# Patient Record
Sex: MALE | Race: BLACK OR AFRICAN AMERICAN | NOT HISPANIC OR LATINO | Employment: FULL TIME | URBAN - METROPOLITAN AREA
[De-identification: names, ages, dates, MRNs, and addresses within clinical notes are randomized per-mention and may not be internally consistent; named-entity substitution may affect disease eponyms.]

---

## 2024-02-21 ENCOUNTER — APPOINTMENT (OUTPATIENT)
Dept: RADIOLOGY | Facility: HOSPITAL | Age: 54
End: 2024-02-21
Payer: MEDICAID

## 2024-02-21 ENCOUNTER — HOSPITAL ENCOUNTER (EMERGENCY)
Facility: HOSPITAL | Age: 54
Discharge: HOME | End: 2024-02-21
Attending: EMERGENCY MEDICINE
Payer: MEDICAID

## 2024-02-21 VITALS
HEART RATE: 75 BPM | WEIGHT: 139 LBS | SYSTOLIC BLOOD PRESSURE: 144 MMHG | HEIGHT: 66 IN | OXYGEN SATURATION: 98 % | BODY MASS INDEX: 22.34 KG/M2 | RESPIRATION RATE: 18 BRPM | DIASTOLIC BLOOD PRESSURE: 103 MMHG | TEMPERATURE: 98 F

## 2024-02-21 DIAGNOSIS — H57.12 PAIN OF LEFT EYE: ICD-10-CM

## 2024-02-21 DIAGNOSIS — R51.9 NONINTRACTABLE HEADACHE, UNSPECIFIED CHRONICITY PATTERN, UNSPECIFIED HEADACHE TYPE: Primary | ICD-10-CM

## 2024-02-21 PROCEDURE — 70450 CT HEAD/BRAIN W/O DYE: CPT | Performed by: RADIOLOGY

## 2024-02-21 PROCEDURE — 96372 THER/PROPH/DIAG INJ SC/IM: CPT

## 2024-02-21 PROCEDURE — 2500000004 HC RX 250 GENERAL PHARMACY W/ HCPCS (ALT 636 FOR OP/ED)

## 2024-02-21 PROCEDURE — 70450 CT HEAD/BRAIN W/O DYE: CPT

## 2024-02-21 PROCEDURE — 99284 EMERGENCY DEPT VISIT MOD MDM: CPT | Mod: 25

## 2024-02-21 PROCEDURE — 2500000001 HC RX 250 WO HCPCS SELF ADMINISTERED DRUGS (ALT 637 FOR MEDICARE OP)

## 2024-02-21 RX ORDER — ERYTHROMYCIN 5 MG/G
OINTMENT OPHTHALMIC 3 TIMES DAILY
Qty: 3.5 G | Refills: 0 | Status: SHIPPED | OUTPATIENT
Start: 2024-02-21 | End: 2024-02-26

## 2024-02-21 RX ORDER — KETOROLAC TROMETHAMINE 30 MG/ML
15 INJECTION, SOLUTION INTRAMUSCULAR; INTRAVENOUS ONCE
Status: COMPLETED | OUTPATIENT
Start: 2024-02-21 | End: 2024-02-21

## 2024-02-21 RX ORDER — METOCLOPRAMIDE 10 MG/1
10 TABLET ORAL ONCE
Status: COMPLETED | OUTPATIENT
Start: 2024-02-21 | End: 2024-02-21

## 2024-02-21 RX ORDER — ACETAMINOPHEN 325 MG/1
650 TABLET ORAL ONCE
Status: COMPLETED | OUTPATIENT
Start: 2024-02-21 | End: 2024-02-21

## 2024-02-21 RX ADMIN — ACETAMINOPHEN 650 MG: 325 TABLET ORAL at 17:09

## 2024-02-21 RX ADMIN — KETOROLAC TROMETHAMINE 15 MG: 30 INJECTION, SOLUTION INTRAMUSCULAR; INTRAVENOUS at 17:09

## 2024-02-21 RX ADMIN — FLUORESCEIN SODIUM 1 STRIP: 1 STRIP OPHTHALMIC at 16:30

## 2024-02-21 RX ADMIN — METOCLOPRAMIDE 10 MG: 10 TABLET ORAL at 17:09

## 2024-02-21 ASSESSMENT — VISUAL ACUITY
OD: 20/20
OS: 20/25

## 2024-02-21 ASSESSMENT — PAIN - FUNCTIONAL ASSESSMENT: PAIN_FUNCTIONAL_ASSESSMENT: 0-10

## 2024-02-21 ASSESSMENT — PAIN SCALES - GENERAL: PAINLEVEL_OUTOF10: 7

## 2024-02-21 ASSESSMENT — COLUMBIA-SUICIDE SEVERITY RATING SCALE - C-SSRS
1. IN THE PAST MONTH, HAVE YOU WISHED YOU WERE DEAD OR WISHED YOU COULD GO TO SLEEP AND NOT WAKE UP?: NO
6. HAVE YOU EVER DONE ANYTHING, STARTED TO DO ANYTHING, OR PREPARED TO DO ANYTHING TO END YOUR LIFE?: NO
2. HAVE YOU ACTUALLY HAD ANY THOUGHTS OF KILLING YOURSELF?: NO

## 2024-02-21 ASSESSMENT — PAIN DESCRIPTION - LOCATION: LOCATION: NECK

## 2024-02-21 NOTE — Clinical Note
Wisam Davis was seen and treated in our emergency department on 2/21/2024.  He may return to work on 02/22/2024.       If you have any questions or concerns, please don't hesitate to call.      Ne Lu PA-C

## 2024-02-21 NOTE — ED TRIAGE NOTES
Patient ambulatory to ED with concern of foreign body to L eye x1 week. Patient states eye pain has been causing headaches radiating into his neck.

## 2024-02-21 NOTE — ED PROVIDER NOTES
HPI   Chief Complaint   Patient presents with    Headache    Eye Problem       HPI  HISTORY OF PRESENT ILLNESS:  53 y.o. male presenting to the ED with complaint of left eye pain for the past 1 week and headache for the past 3 days.  Patient states that he thinks that he got a foreign body in the left eye, but there was no specific instance of any object or foreign body entering his eye.  He states that he woke up 1 week ago with pain and irritation and foreign body sensation in the left eye.  He states it has been tearing excessively, mildly red, irritated and painful.  He states it feels like the foreign body moves around inside his eyelids.  He denies blurry vision, no vision loss or changes.  Denies swelling, redness, or pain of the eyelids or periorbital area.  He states that about 3 days ago he began having right-sided headaches, seem to start in the right occipital area and adjacent right neck and radiate around the right side of the head behind the right ear.  He describes it as aching in quality.  Not severe, he has not taken any medications for headache.  Headache was not thunderclap in onset and is not the worst headache of his life.  He denies nausea or vomiting.  No difficulty with gait, speech, or swallowing.  No fevers or chills.  No neck stiffness.  No extremity numbness, tingling, or weakness.  No dizziness, lightheadedness, or syncope.  No chest pain or shortness of breath.  No abdominal pain.  Does not wear contacts and does not use glasses.  No other complaints or symptoms voiced.    12 point review of systems was performed and is negative unless otherwise specified in HPI.    PMH: denies  Family history: noncontributory  Social history: +smoker, no ETOH, no illicit substances  No past medical history on file.      Labs Reviewed - No data to display  No orders to display              [place] Coma Scale Score: 15                     Patient History   No past medical history on file.  No past  surgical history on file.  No family history on file.  Social History     Tobacco Use    Smoking status: Not on file    Smokeless tobacco: Not on file   Substance Use Topics    Alcohol use: Not on file    Drug use: Not on file       Physical Exam   ED Triage Vitals [02/21/24 1525]   Temperature Heart Rate Respirations BP   36.7 °C (98 °F) 75 18 (!) 144/103      Pulse Ox Temp src Heart Rate Source Patient Position   98 % -- -- --      BP Location FiO2 (%)     -- --       Physical Exam  Constitutional:       General: He is not in acute distress.     Appearance: He is not ill-appearing, toxic-appearing or diaphoretic.   HENT:      Head: Normocephalic and atraumatic.      Nose: No congestion.      Mouth/Throat:      Mouth: Mucous membranes are moist.   Eyes:      General: No visual field deficit or scleral icterus.     Extraocular Movements: Extraocular movements intact.      Right eye: Normal extraocular motion and no nystagmus.      Left eye: Normal extraocular motion and no nystagmus.      Pupils: Pupils are equal, round, and reactive to light. Pupils are equal.      Right eye: Pupil is round and reactive.      Left eye: Pupil is round and reactive.      Comments: Right eye WNL.    Left eye: +mild conjunctival injection, mild excessive tearing noted. Eyelids everted, and there is a very tiny punctate calcification on the mid medial upper eyelid, not consistent with a stye or chalazion, nontender.  No other foreign bodies noted.  No limbal injection, no purulent or crusting discharge.  No edema or erythema of the eyelids or periorbital area.  Extraocular movements intact and nonpainful. No evidence of preseptal or orbital cellulitis.  Pupils equal round and reactive. Globes equally firm bilaterally. No visual field deficits. No nystagmus. No direct or consensual photophobia. No proptosis.    IOP 15 mmHg on left (affected) eye, 16 mmHg on right (unaffected) eye    Visual acuity: Bilateral 20/15  Right 20/20  Left  20/25    Fluorescein stain performed.  Irritation and pain completely resolved after instillation of tetracaine drops.  After fluorescein instilled under Woods lamp, there is a very very tiny area of uptake likely representing an abrasion between 5 and 6 o'clock.  No Dendritic lesions, no corneal or conjunctival foreign bodies noted, negative Layo sign.    Neck:      Comments: Mild TTP over the right superior cervical paraspinal musculature.  No midline tenderness over the cervical or thoracic spine.  No step-offs or crepitus.  Upper extremities neurovascular intact, no motor or sensory deficits, 2+ radial pulses.  Sensation intact and equal throughout the face, neck, and BUE.  Cardiovascular:      Rate and Rhythm: Normal rate and regular rhythm.      Pulses: Normal pulses.   Pulmonary:      Effort: Pulmonary effort is normal. No respiratory distress.      Breath sounds: Normal breath sounds.   Musculoskeletal:         General: Normal range of motion.      Cervical back: Normal range of motion and neck supple. No rigidity.      Right lower leg: No edema.      Left lower leg: No edema.   Lymphadenopathy:      Cervical: No cervical adenopathy.   Skin:     General: Skin is warm and dry.      Capillary Refill: Capillary refill takes less than 2 seconds.   Neurological:      General: No focal deficit present.      Mental Status: He is alert and oriented to person, place, and time.      GCS: GCS eye subscore is 4. GCS verbal subscore is 5. GCS motor subscore is 6.      Cranial Nerves: No cranial nerve deficit, dysarthria or facial asymmetry.      Sensory: No sensory deficit.      Motor: No weakness.      Coordination: Coordination normal.      Gait: Gait normal.   Psychiatric:         Mood and Affect: Mood normal.         Behavior: Behavior normal.         Judgment: Judgment normal.     ED Course & MDM   Diagnoses as of 02/21/24 8808   Nonintractable headache, unspecified chronicity pattern, unspecified headache type    Pain of left eye       Medical Decision Making  ED course / MDM     Summary:  Patient presented with left eye pain and headache.  Vital signs stable, patient appears nontoxic.  Ambulates unassisted with steady gait.  On exam, there is mild conjunctival injection of the left eye, mild excessive tearing, eyelids everted and there is a very small calcification on the inner upper eyelid, not consistent with a hordeolum or stye, is not foreign body.  At that time he felt like the foreign body was at the lower lid.  No limbal injection, no edema or erythema of the eyelids or periorbital area, no purulent or crusting discharge.  Extraocular movements intact and nonpainful.  No visual field deficits.  Visual acuity intact, normal IOP.  Pain significantly improved after instillation of tetracaine drops.  Fluorescein stain performed, and there is a very tiny area of uptake likely representing an abrasion at the 5 to 6 o'clock position.  Negative Lyao sign.  No foreign bodies noted.  There is some tenderness over the right superior cervical paraspinal musculature, no midline spinal tenderness.  No ataxia, no motor or sensory deficits of the upper or lower extremities, no gross neurologic deficits.  Will obtain CT head as well.  Patient was given a dose of Reglan, Toradol, and Tylenol in the ED, which significantly improved his headache. CT head shows no acute process, no acute cortical infarct, intracranial hemorrhage, or skull fracture. Patient case discussed with ED attending Dr. Jenkins, who also saw and evaluated the patient. Results and differential were discussed in detail with the patient.  Has a very small corneal abrasion which may be the cause of his eye pain.  His discomfort is not severe, he has normal visual acuity and IOP, no evidence of acute glaucoma or iritis.  There is no visualized foreign body on exam.  Will prescribe erythromycin ointment and have the patient follow-up with ophthalmology.  Headache  is low risk and description, not the risk of his life, not thunderclap in onset, no meningeal signs, no systemic symptoms, doubt SAH or meningitis, patient agrees LP is not indicated.  Most consistent with a tension headache or occipital neuralgia.  He will follow-up with his PCP regarding all of his complaints today.  Patient is eager to be discharged. Patient was given strict return precautions, understands reasons to return to the ED. Also discussed supportive care instructions. I expressed the importance of outpatient follow up with their PCP. All questions were answered, patient expressed understanding and stated that they would comply.    Patient was advised to follow up with PCP or recommended provider in 2-3 days for another evaluation and exam. I advised patient and family/friend/caregiver/guardian to return or go to closest emergency room immediately if symptoms change, get worse, new symptoms develop prior to follow up. If there is no improvement in symptoms in the next 24 hours they are advised to return for further evaluation and exam. I also explained the plan and treatment course. Patient and family/friend/caregiver/guardian is in agreement with plan, treatment course, and follow up and states verbally that they will comply.    Impression:  1. See diagnosis    Plan: Homegoing. I discussed the differential, results, and discharge plan with the patient and family/friend/caregiver. I emphasized the importance of follow-up with the physician I referred them to in the timeframe recommended.  I explained reasons for the patient to return to the Emergency Department. They agreed that if they feel their condition is worsening or if they have any other concern they should call 911 immediately for further assistance. We also discussed medications that were prescribed including common side effects and interactions. The patient was advised to abstain from driving, operating heavy machinery, or making significant  decisions while taking medications such as opiates and muscle relaxers that may impair this. I gave the patient an opportunity to ask all questions they had and answered all of them accordingly. They understand return precautions and discharge instructions. The patient and family/friend/caregiver expressed understanding verbally and that they would comply.       Disposition: Discharge    Patient seen and discussed with Dr. Jenkins    This note has been transcribed using voice recognition and may contain grammatical errors, misplaced words, incorrect words, incorrect phrases or other errors.   Procedure  Procedures     Ne Lu PA-C  02/21/24 0902

## 2024-06-12 ENCOUNTER — APPOINTMENT (OUTPATIENT)
Dept: RADIOLOGY | Facility: HOSPITAL | Age: 54
End: 2024-06-12
Payer: MEDICAID

## 2024-06-12 ENCOUNTER — HOSPITAL ENCOUNTER (EMERGENCY)
Facility: HOSPITAL | Age: 54
Discharge: HOME | End: 2024-06-12
Payer: MEDICAID

## 2024-06-12 VITALS
RESPIRATION RATE: 16 BRPM | BODY MASS INDEX: 21.69 KG/M2 | WEIGHT: 135 LBS | SYSTOLIC BLOOD PRESSURE: 117 MMHG | HEIGHT: 66 IN | TEMPERATURE: 98.1 F | OXYGEN SATURATION: 99 % | HEART RATE: 82 BPM | DIASTOLIC BLOOD PRESSURE: 81 MMHG

## 2024-06-12 DIAGNOSIS — Z11.3 SCREEN FOR STD (SEXUALLY TRANSMITTED DISEASE): Primary | ICD-10-CM

## 2024-06-12 DIAGNOSIS — M54.50 ACUTE LOW BACK PAIN, UNSPECIFIED BACK PAIN LATERALITY, UNSPECIFIED WHETHER SCIATICA PRESENT: ICD-10-CM

## 2024-06-12 DIAGNOSIS — R82.998 URINE WHITE BLOOD CELLS INCREASED: ICD-10-CM

## 2024-06-12 LAB
APPEARANCE UR: CLEAR
BACTERIA #/AREA URNS AUTO: ABNORMAL /HPF
BILIRUB UR STRIP.AUTO-MCNC: NEGATIVE MG/DL
COLOR UR: YELLOW
GLUCOSE UR STRIP.AUTO-MCNC: NORMAL MG/DL
KETONES UR STRIP.AUTO-MCNC: ABNORMAL MG/DL
LEUKOCYTE ESTERASE UR QL STRIP.AUTO: ABNORMAL
MUCOUS THREADS #/AREA URNS AUTO: ABNORMAL /LPF
NITRITE UR QL STRIP.AUTO: NEGATIVE
PH UR STRIP.AUTO: 5 [PH]
PROT UR STRIP.AUTO-MCNC: ABNORMAL MG/DL
RBC # UR STRIP.AUTO: ABNORMAL /UL
RBC #/AREA URNS AUTO: ABNORMAL /HPF
SP GR UR STRIP.AUTO: 1.03
SQUAMOUS #/AREA URNS AUTO: ABNORMAL /HPF
UROBILINOGEN UR STRIP.AUTO-MCNC: ABNORMAL MG/DL
WBC #/AREA URNS AUTO: >50 /HPF

## 2024-06-12 PROCEDURE — 2500000005 HC RX 250 GENERAL PHARMACY W/O HCPCS

## 2024-06-12 PROCEDURE — 87661 TRICHOMONAS VAGINALIS AMPLIF: CPT | Mod: AHULAB

## 2024-06-12 PROCEDURE — 2500000004 HC RX 250 GENERAL PHARMACY W/ HCPCS (ALT 636 FOR OP/ED)

## 2024-06-12 PROCEDURE — 86780 TREPONEMA PALLIDUM: CPT | Mod: AHULAB

## 2024-06-12 PROCEDURE — 99283 EMERGENCY DEPT VISIT LOW MDM: CPT

## 2024-06-12 PROCEDURE — 72110 X-RAY EXAM L-2 SPINE 4/>VWS: CPT

## 2024-06-12 PROCEDURE — 72110 X-RAY EXAM L-2 SPINE 4/>VWS: CPT | Performed by: STUDENT IN AN ORGANIZED HEALTH CARE EDUCATION/TRAINING PROGRAM

## 2024-06-12 PROCEDURE — 87389 HIV-1 AG W/HIV-1&-2 AB AG IA: CPT | Mod: AHULAB

## 2024-06-12 PROCEDURE — 96372 THER/PROPH/DIAG INJ SC/IM: CPT

## 2024-06-12 PROCEDURE — 36415 COLL VENOUS BLD VENIPUNCTURE: CPT

## 2024-06-12 PROCEDURE — 2500000001 HC RX 250 WO HCPCS SELF ADMINISTERED DRUGS (ALT 637 FOR MEDICARE OP)

## 2024-06-12 PROCEDURE — 87491 CHLMYD TRACH DNA AMP PROBE: CPT | Mod: AHULAB

## 2024-06-12 PROCEDURE — 81001 URINALYSIS AUTO W/SCOPE: CPT

## 2024-06-12 RX ORDER — DOXYCYCLINE HYCLATE 100 MG
100 TABLET ORAL ONCE
Status: COMPLETED | OUTPATIENT
Start: 2024-06-12 | End: 2024-06-12

## 2024-06-12 RX ORDER — LIDOCAINE 50 MG/G
1 PATCH TOPICAL DAILY
Qty: 5 PATCH | Refills: 0 | Status: SHIPPED | OUTPATIENT
Start: 2024-06-12 | End: 2024-06-12

## 2024-06-12 RX ORDER — LIDOCAINE 50 MG/G
1 PATCH TOPICAL DAILY
Qty: 5 PATCH | Refills: 0 | Status: SHIPPED | OUTPATIENT
Start: 2024-06-12 | End: 2024-06-17

## 2024-06-12 RX ORDER — NAPROXEN 500 MG/1
500 TABLET ORAL EVERY 12 HOURS
Qty: 6 TABLET | Refills: 0 | Status: SHIPPED | OUTPATIENT
Start: 2024-06-12 | End: 2024-06-15

## 2024-06-12 RX ORDER — LIDOCAINE 560 MG/1
1 PATCH PERCUTANEOUS; TOPICAL; TRANSDERMAL ONCE
Status: DISCONTINUED | OUTPATIENT
Start: 2024-06-12 | End: 2024-06-12 | Stop reason: HOSPADM

## 2024-06-12 RX ORDER — ACETAMINOPHEN 500 MG
1000 TABLET ORAL EVERY 8 HOURS PRN
Qty: 18 TABLET | Refills: 0 | Status: SHIPPED | OUTPATIENT
Start: 2024-06-12 | End: 2024-06-12

## 2024-06-12 RX ORDER — ACETAMINOPHEN 500 MG
1000 TABLET ORAL EVERY 8 HOURS PRN
Qty: 18 TABLET | Refills: 0 | Status: SHIPPED | OUTPATIENT
Start: 2024-06-12 | End: 2024-06-15

## 2024-06-12 RX ORDER — SULFAMETHOXAZOLE AND TRIMETHOPRIM 800; 160 MG/1; MG/1
1 TABLET ORAL EVERY 12 HOURS
Qty: 28 TABLET | Refills: 0 | Status: SHIPPED | OUTPATIENT
Start: 2024-06-12 | End: 2024-06-26

## 2024-06-12 RX ORDER — CEFTRIAXONE 500 MG/1
500 INJECTION, POWDER, FOR SOLUTION INTRAMUSCULAR; INTRAVENOUS ONCE
Status: COMPLETED | OUTPATIENT
Start: 2024-06-12 | End: 2024-06-12

## 2024-06-12 RX ORDER — NAPROXEN 500 MG/1
500 TABLET ORAL EVERY 12 HOURS
Qty: 6 TABLET | Refills: 0 | Status: SHIPPED | OUTPATIENT
Start: 2024-06-12 | End: 2024-06-12

## 2024-06-12 RX ORDER — KETOROLAC TROMETHAMINE 30 MG/ML
15 INJECTION, SOLUTION INTRAMUSCULAR; INTRAVENOUS ONCE
Status: COMPLETED | OUTPATIENT
Start: 2024-06-12 | End: 2024-06-12

## 2024-06-12 RX ORDER — METRONIDAZOLE 500 MG/1
2000 TABLET ORAL ONCE
Status: COMPLETED | OUTPATIENT
Start: 2024-06-12 | End: 2024-06-12

## 2024-06-12 RX ORDER — SULFAMETHOXAZOLE AND TRIMETHOPRIM 800; 160 MG/1; MG/1
1 TABLET ORAL EVERY 12 HOURS
Qty: 28 TABLET | Refills: 0 | Status: SHIPPED | OUTPATIENT
Start: 2024-06-12 | End: 2024-06-12

## 2024-06-12 RX ORDER — ACETAMINOPHEN 325 MG/1
975 TABLET ORAL ONCE
Status: COMPLETED | OUTPATIENT
Start: 2024-06-12 | End: 2024-06-12

## 2024-06-12 ASSESSMENT — PAIN - FUNCTIONAL ASSESSMENT: PAIN_FUNCTIONAL_ASSESSMENT: 0-10

## 2024-06-12 ASSESSMENT — COLUMBIA-SUICIDE SEVERITY RATING SCALE - C-SSRS
6. HAVE YOU EVER DONE ANYTHING, STARTED TO DO ANYTHING, OR PREPARED TO DO ANYTHING TO END YOUR LIFE?: NO
1. IN THE PAST MONTH, HAVE YOU WISHED YOU WERE DEAD OR WISHED YOU COULD GO TO SLEEP AND NOT WAKE UP?: NO
2. HAVE YOU ACTUALLY HAD ANY THOUGHTS OF KILLING YOURSELF?: NO

## 2024-06-12 ASSESSMENT — PAIN SCALES - GENERAL
PAINLEVEL_OUTOF10: 5 - MODERATE PAIN
PAINLEVEL_OUTOF10: 5 - MODERATE PAIN

## 2024-06-12 NOTE — ED TRIAGE NOTES
PT PRESENTS TO THE ED FOR LOWER BACK PAIN FROM LIFTING AND STI CHECK UP. PT ENDORSES THAT SHE SLEPT WITH SOMEONE WHO HAD TRICH. PT DENIES CHEST PAIN OR SOB. PT DENIES FEVERS RO CHILLS.

## 2024-06-12 NOTE — Clinical Note
Wisam Davis was seen and treated in our emergency department on 6/12/2024.  He may return to work on 06/15/2024.  No restrictions to work     If you have any questions or concerns, please don't hesitate to call.      Raúl Jiang PA-C

## 2024-06-12 NOTE — Clinical Note
Wisam Davis was seen and treated in our emergency department on 6/12/2024.  He may return to work on 06/15/2024.       If you have any questions or concerns, please don't hesitate to call.      Raúl Jiang PA-C

## 2024-06-12 NOTE — ED PROVIDER NOTES
HPI   Chief Complaint   Patient presents with    STI Screening    Back Pain       53-year-old male presents the ED today with a chief concern of multiple medical complaints.  First, patient states that he would like to be tested for STDs.  He states that 4 days ago he had intercourse with someone who tested positive for trichomonas.  He states that he is not having any symptoms.  Denies penile discharge.  Denies dysuria, urinary urgency/frequency, hematuria.  Denies pain or abnormalities in the genital area.  Denies any ulcers or lesions on the area.  Second, patient states that he wants to get his back looked at.  He states that for the past 4 days he is also had bilateral lower back pain.  He states that he recently he has been climbing a ladder platforms at work.  He states that he also has been in a squatting position a lot recently.  States that he had to change the brakes on someone's car and was also doing a lot of bending down during this.  He describes the pain as stiffness, soreness, and nagging pain located in the bilateral lower back.  He states the pain is worse with bending and twisting.  He denies any fever/chills, nausea/vomiting.  Denies numbness or tingling or weakness.  Denies saddle anesthesia or urinary/fecal incontinence retention.  Does not feel he is retaining urine.  He denies any chest pain or shortness of breath.  Denies lightheadedness, dizziness, or syncope.  Denies history of IV drug use.  Denies use of anticoagulation.  He denies any history of PE or DVT.  Denies cough, sputum, or hemoptysis.  He has not taken any medications at home for symptoms.  He has no other symptoms or concerns at this time.      History provided by:  Patient   used: No                        [place] Coma Scale Score: 15                     Patient History   History reviewed. No pertinent past medical history.  History reviewed. No pertinent surgical history.  No family history on  file.  Social History     Tobacco Use    Smoking status: Not on file    Smokeless tobacco: Not on file   Substance Use Topics    Alcohol use: Not on file    Drug use: Not on file       Physical Exam   ED Triage Vitals [06/12/24 1737]   Temperature Heart Rate Respirations BP   37.4 °C (99.3 °F) 82 16 117/81      Pulse Ox Temp src Heart Rate Source Patient Position   99 % -- -- --      BP Location FiO2 (%)     -- --       Physical Exam  General: The pain the patient is sitting comfortably no acute distress.  Vital signs per nursing note.  Skin: No rashes, lesions, scars.  Normal skin turgor.  Head: Atraumatic normocephalic  Neck: The neck is supple.  The trachea is midline.   Lungs: Lungs are clear to auscultation bilaterally.  No rhonchi, wheezing, or rales.  No stridor.  Symmetric chest expansion  Heart: Normal S1-S2 no murmurs, rubs, gallops.  Abdomen: Abdomen is flat, nontender, nondistended.  No rebound tenderness or guarding.   No pulsatile mass.  No CVA tenderness  Peripheral vascular: Symmetric 2+ radial, dorsalis pedis, and posterior tibial pulses.  Capillary refill is under 3 seconds.  Neurologic: Alert and oriented x4.   5/5 strength in the upper and lower extremity.  Sensation is intact in the upper and lower extremity.  Sensation is intact in the inner thigh and groin.  Patient is able to cross the legs, extend and flex the knee, point the great toe up, dorsiflex and plantarflex the ankle.  2+ reflexes at the patellar tendon.  Normal Babinski.  Normal gait.  Musculoskeletal: No overlying skin changes throughout the entire back.  No tenderness over the spinal processes throughout the back.  There is tenderness to palpation over the paraspinal muscles in bilateral lumbar spine. Full range of motion of the back.   : Deferred    ED Course & MDM   ED Course as of 06/13/24 1457   Wed Jun 12, 2024 1928 FINDINGS:  Lumbar lordosis is maintained. Vertebral body heights and disc spaces  are within normal limits.  Pedicles are intact. Osseous architecture  is maintained. No acute fracture or traumatic subluxation. No  significant degenerative changes of the lumbar spine.      SI joints are unremarkable. No suspicious lytic or blastic lesions.      Large amount of colonic stool. Dense atherosclerotic calcification  abdominal aorta.      IMPRESSION:  No acute fracture or traumatic malalignment.          Signed by: Salvador Forbes 6/12/2024 7:24 PM  Dictation workstation:   [id number]   []   2030 Patient declines  examination []   2040 No step offs []   2041 Patient was not sent prescription for doxycycline []   2041 Pending syphilis, trichomonas, HIV, GC/CHL []      ED Course User Index  [] Raúl Jiang PA-C         Diagnoses as of 06/13/24 7612   Screen for STD (sexually transmitted disease)   Acute low back pain, unspecified back pain laterality, unspecified whether sciatica present   Urine white blood cells increased       Medical Decision Making  53-year-old male presents the ED today with a chief concern of multiple medical complaints.  Vital signs reassuring.  Patient overall appears well and is nontoxic-appearing.  I had nursing staff repeat temperature upon arrival.  Upon arrival temperature was 99.3 °F.  On repeat without analgesics or antipyretics it was 98.1. Patient has full range of motion of the neck without any meningismus.  Satting on room air.  Not hypoxic.  Not tachycardic.  Afebrile.  He is fully neurologically intact with no acute neurological deficits.  Neurovascular status intact in lower extremities bilaterally.  No signs of cord compression.  No emergent MRI indicated at this time.  X-ray of L-spine shows no acute fracture or malalignment.  No zoster rash.  No abdominal tenderness.  Low suspicion for AAA or pancreatitis at this time.  Considered CT however do not think further workup with blood work and imaging is necessary at this time.  Urinalysis shows over 50 white blood cells in  the urine and 6-10 red blood cells in the urine.  He is positive for leukocyte esterase.  Negative nitrite.  Culture was sent.  HIV, syphilis, GC/CHL, and trichomonas pending.  He mainly has low back pain however given over 50 white blood cells in the urine this could be due to pyelonephritis versus from STD.  Will treat outpatient with analgesics, also relaxants, and Bactrim at this time.  No systemic signs or symptoms.  Discussed impression and findings with patient he feels comfortable returning home.  We discussed very strict return precautions including returning for any new or worsening signs return.  Patient is in agreement this plan.  He will follow-up with the PCP within 3 days.  Again discussed strict return precautions.  Discharged with analgesics and muscle relaxants, and Bactrim.  Discussed use and possible side effects of this medication.  Was not given doxycycline outpatient for STD treatment.  Discussed signs and symptoms of cord compression.  Also patient have low threshold for coming back if symptoms worsen and at this time we will proceed with CT and blood work.  Patient declines  exam today.    Differential diagnosis: STD, UTI, back strain, AAA rupture, cauda equina syndrome, cord compression, compression fracture, epidural abscess, epidural hematoma, herniated disc, sciatica, nephrolithiasis, ureterolithiasis, PE, pyelonephritis, vertebral fracture, pancreatitis, zoster    Disposition/treatment  1.  Acute low back pain  2.  Screen for STD  3.  Urine white blood cells increased    Shared decision-making was used patient feels comfortable returning home     Patient was advised to follow up with recommended provider in 1 day1 for another evaluation and exam. I advised patient/guardian to return or go to closest emergency room immediately if symptoms change, get worse, new symptoms develop prior to follow up. If there is no improvement in symptoms in the next 24 hours they are advised to return  for further evaluation and exam. I also explained the plan and treatment course. Patient/guardian is in agreement with plan, treatment course, and follow up and states verbally that they will comply.    Homegoing. I discussed the differential; results and discharge plan with the patient and/or family/friend/caregiver if present.  I emphasized the importance of follow-up with the physician I referred them to in the timeframe recommended.  I explained reasons for the patient to return to the Emergency Department. They agreed that if they feel their condition is worsening or if they have any other concern they should call 911 immediately for further assistance. I gave the patient an opportunity to ask all questions they had and answered all of them accordingly. They understand return precautions and discharge instructions. The patient and/or family/friend/caregiver expressed understanding verbally and that they would comply.        This note has been transcribed using voice recognition and may contain grammatical errors, misplaced words, incorrect words, incorrect phrases or other errors.        Procedure  Procedures     Raúl Jiang PA-C  06/13/24 1501       Raúl Jiang PA-C  06/13/24 1502

## 2024-06-13 LAB
C TRACH RRNA SPEC QL NAA+PROBE: NEGATIVE
HIV 1+2 AB+HIV1 P24 AG SERPL QL IA: NONREACTIVE
N GONORRHOEA DNA SPEC QL PROBE+SIG AMP: NEGATIVE
T VAGINALIS RRNA SPEC QL NAA+PROBE: POSITIVE
TREPONEMA PALLIDUM IGG+IGM AB [PRESENCE] IN SERUM OR PLASMA BY IMMUNOASSAY: NONREACTIVE

## 2024-06-13 NOTE — DISCHARGE INSTRUCTIONS
Please return to the ED immediately if he have any new or worsening signs or symptoms  Please follow-up with your primary care doctor within 3 days

## 2024-06-15 ENCOUNTER — TELEPHONE (OUTPATIENT)
Dept: PHARMACY | Facility: HOSPITAL | Age: 54
End: 2024-06-15
Payer: MEDICAID

## 2024-06-15 NOTE — PROGRESS NOTES
EDPD Note: Antibiotics Reviewed and Warranted    Contacted Mr. Wisam Davis regarding a positive trichomonas result that was taken during their recent emergency room visit. I completed education with patient . The patient was treated appropriately with metronidazole 2 g x 1 dose while in the ED. Patient was also sent home on Bactrim DS BID x 14 with associated diagnosis of back pain, and increased urine white blood cells. Patient states he has been taking this for UTI. Patient states no urinary symptoms present. Okay to discontinue the Bactrim. Patient demonstrated understanding and had no further questions.    No results found for the last 90 days.       No further follow up needed from EDPD Team.     Francheska Andrade, PharmD

## 2024-06-15 NOTE — TELEPHONE ENCOUNTER
I reviewed the progress note and agree with the resident’s findings and plans as written. Case discussed with resident.    Fariba Meadows, NohemyD

## 2024-10-15 ENCOUNTER — HOSPITAL ENCOUNTER (EMERGENCY)
Facility: HOSPITAL | Age: 54
Discharge: HOME | End: 2024-10-15
Attending: EMERGENCY MEDICINE
Payer: MEDICAID

## 2024-10-15 ENCOUNTER — APPOINTMENT (OUTPATIENT)
Dept: RADIOLOGY | Facility: HOSPITAL | Age: 54
End: 2024-10-15
Payer: MEDICAID

## 2024-10-15 VITALS
DIASTOLIC BLOOD PRESSURE: 87 MMHG | HEIGHT: 66 IN | OXYGEN SATURATION: 98 % | WEIGHT: 140 LBS | RESPIRATION RATE: 18 BRPM | SYSTOLIC BLOOD PRESSURE: 124 MMHG | BODY MASS INDEX: 22.5 KG/M2 | TEMPERATURE: 97.8 F | HEART RATE: 68 BPM

## 2024-10-15 DIAGNOSIS — S06.341A: Primary | ICD-10-CM

## 2024-10-15 LAB
ABO GROUP (TYPE) IN BLOOD: NORMAL
ALBUMIN SERPL BCP-MCNC: 3.7 G/DL (ref 3.4–5)
ALP SERPL-CCNC: 43 U/L (ref 33–120)
ALT SERPL W P-5'-P-CCNC: 10 U/L (ref 10–52)
ANION GAP SERPL CALC-SCNC: 7 MMOL/L (ref 10–20)
ANTIBODY SCREEN: NORMAL
APTT PPP: 33 SECONDS (ref 27–38)
AST SERPL W P-5'-P-CCNC: 19 U/L (ref 9–39)
BASOPHILS # BLD AUTO: 0.07 X10*3/UL (ref 0–0.1)
BASOPHILS NFR BLD AUTO: 0.8 %
BILIRUB SERPL-MCNC: 0.4 MG/DL (ref 0–1.2)
BUN SERPL-MCNC: 12 MG/DL (ref 6–23)
CALCIUM SERPL-MCNC: 9.1 MG/DL (ref 8.6–10.3)
CHLORIDE SERPL-SCNC: 101 MMOL/L (ref 98–107)
CO2 SERPL-SCNC: 33 MMOL/L (ref 21–32)
CREAT SERPL-MCNC: 0.97 MG/DL (ref 0.5–1.3)
EGFRCR SERPLBLD CKD-EPI 2021: >90 ML/MIN/1.73M*2
EOSINOPHIL # BLD AUTO: 0.06 X10*3/UL (ref 0–0.7)
EOSINOPHIL NFR BLD AUTO: 0.7 %
ERYTHROCYTE [DISTWIDTH] IN BLOOD BY AUTOMATED COUNT: 12.2 % (ref 11.5–14.5)
GLUCOSE SERPL-MCNC: 82 MG/DL (ref 74–99)
HCT VFR BLD AUTO: 45.3 % (ref 41–52)
HGB BLD-MCNC: 15.2 G/DL (ref 13.5–17.5)
IMM GRANULOCYTES # BLD AUTO: 0.04 X10*3/UL (ref 0–0.7)
IMM GRANULOCYTES NFR BLD AUTO: 0.5 % (ref 0–0.9)
INR PPP: 0.9 (ref 0.9–1.1)
LYMPHOCYTES # BLD AUTO: 2.14 X10*3/UL (ref 1.2–4.8)
LYMPHOCYTES NFR BLD AUTO: 25.7 %
MCH RBC QN AUTO: 32.4 PG (ref 26–34)
MCHC RBC AUTO-ENTMCNC: 33.6 G/DL (ref 32–36)
MCV RBC AUTO: 97 FL (ref 80–100)
MONOCYTES # BLD AUTO: 1.03 X10*3/UL (ref 0.1–1)
MONOCYTES NFR BLD AUTO: 12.4 %
NEUTROPHILS # BLD AUTO: 4.99 X10*3/UL (ref 1.2–7.7)
NEUTROPHILS NFR BLD AUTO: 59.9 %
NRBC BLD-RTO: 0 /100 WBCS (ref 0–0)
PLATELET # BLD AUTO: 521 X10*3/UL (ref 150–450)
POTASSIUM SERPL-SCNC: 4.4 MMOL/L (ref 3.5–5.3)
PROT SERPL-MCNC: 6.7 G/DL (ref 6.4–8.2)
PROTHROMBIN TIME: 9.9 SECONDS (ref 9.8–12.8)
RBC # BLD AUTO: 4.69 X10*6/UL (ref 4.5–5.9)
RH FACTOR (ANTIGEN D): NORMAL
SODIUM SERPL-SCNC: 137 MMOL/L (ref 136–145)
WBC # BLD AUTO: 8.3 X10*3/UL (ref 4.4–11.3)

## 2024-10-15 PROCEDURE — 72125 CT NECK SPINE W/O DYE: CPT

## 2024-10-15 PROCEDURE — 84075 ASSAY ALKALINE PHOSPHATASE: CPT | Performed by: PHYSICIAN ASSISTANT

## 2024-10-15 PROCEDURE — 85610 PROTHROMBIN TIME: CPT | Performed by: PHYSICIAN ASSISTANT

## 2024-10-15 PROCEDURE — 70450 CT HEAD/BRAIN W/O DYE: CPT

## 2024-10-15 PROCEDURE — 2500000001 HC RX 250 WO HCPCS SELF ADMINISTERED DRUGS (ALT 637 FOR MEDICARE OP): Performed by: PHYSICIAN ASSISTANT

## 2024-10-15 PROCEDURE — 70486 CT MAXILLOFACIAL W/O DYE: CPT | Performed by: RADIOLOGY

## 2024-10-15 PROCEDURE — 72125 CT NECK SPINE W/O DYE: CPT | Performed by: RADIOLOGY

## 2024-10-15 PROCEDURE — 86850 RBC ANTIBODY SCREEN: CPT | Performed by: PHYSICIAN ASSISTANT

## 2024-10-15 PROCEDURE — 76376 3D RENDER W/INTRP POSTPROCES: CPT | Performed by: RADIOLOGY

## 2024-10-15 PROCEDURE — 76377 3D RENDER W/INTRP POSTPROCES: CPT

## 2024-10-15 PROCEDURE — 99285 EMERGENCY DEPT VISIT HI MDM: CPT | Mod: 25

## 2024-10-15 PROCEDURE — 70450 CT HEAD/BRAIN W/O DYE: CPT | Performed by: RADIOLOGY

## 2024-10-15 PROCEDURE — 85025 COMPLETE CBC W/AUTO DIFF WBC: CPT | Performed by: PHYSICIAN ASSISTANT

## 2024-10-15 PROCEDURE — 36415 COLL VENOUS BLD VENIPUNCTURE: CPT | Performed by: PHYSICIAN ASSISTANT

## 2024-10-15 PROCEDURE — 2500000001 HC RX 250 WO HCPCS SELF ADMINISTERED DRUGS (ALT 637 FOR MEDICARE OP)

## 2024-10-15 PROCEDURE — 70486 CT MAXILLOFACIAL W/O DYE: CPT

## 2024-10-15 PROCEDURE — 70450 CT HEAD/BRAIN W/O DYE: CPT | Performed by: STUDENT IN AN ORGANIZED HEALTH CARE EDUCATION/TRAINING PROGRAM

## 2024-10-15 PROCEDURE — 85730 THROMBOPLASTIN TIME PARTIAL: CPT | Performed by: PHYSICIAN ASSISTANT

## 2024-10-15 RX ORDER — TETRACAINE HYDROCHLORIDE 5 MG/ML
1 SOLUTION OPHTHALMIC ONCE
Status: DISCONTINUED | OUTPATIENT
Start: 2024-10-15 | End: 2024-10-15

## 2024-10-15 RX ORDER — HYDROCODONE BITARTRATE AND ACETAMINOPHEN 5; 325 MG/1; MG/1
1 TABLET ORAL EVERY 6 HOURS PRN
Qty: 12 TABLET | Refills: 0 | Status: SHIPPED | OUTPATIENT
Start: 2024-10-15 | End: 2024-10-19

## 2024-10-15 RX ORDER — TETRACAINE HYDROCHLORIDE 5 MG/ML
1 SOLUTION OPHTHALMIC ONCE
Status: COMPLETED | OUTPATIENT
Start: 2024-10-15 | End: 2024-10-15

## 2024-10-15 RX ORDER — ACETAMINOPHEN 325 MG/1
650 TABLET ORAL ONCE
Status: COMPLETED | OUTPATIENT
Start: 2024-10-15 | End: 2024-10-15

## 2024-10-15 ASSESSMENT — PAIN DESCRIPTION - PAIN TYPE: TYPE: ACUTE PAIN

## 2024-10-15 ASSESSMENT — PAIN - FUNCTIONAL ASSESSMENT
PAIN_FUNCTIONAL_ASSESSMENT: 0-10
PAIN_FUNCTIONAL_ASSESSMENT: 0-10

## 2024-10-15 ASSESSMENT — COLUMBIA-SUICIDE SEVERITY RATING SCALE - C-SSRS
6. HAVE YOU EVER DONE ANYTHING, STARTED TO DO ANYTHING, OR PREPARED TO DO ANYTHING TO END YOUR LIFE?: NO
2. HAVE YOU ACTUALLY HAD ANY THOUGHTS OF KILLING YOURSELF?: NO
1. IN THE PAST MONTH, HAVE YOU WISHED YOU WERE DEAD OR WISHED YOU COULD GO TO SLEEP AND NOT WAKE UP?: NO

## 2024-10-15 ASSESSMENT — PAIN SCALES - GENERAL
PAINLEVEL_OUTOF10: 2
PAINLEVEL_OUTOF10: 2

## 2024-10-15 ASSESSMENT — VISUAL ACUITY
OS: 20/15
OD: 20/50
OU: 20/30

## 2024-10-15 ASSESSMENT — PAIN DESCRIPTION - LOCATION: LOCATION: HEAD
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
## 2024-10-15 NOTE — Clinical Note
Wisam Ryan was seen and treated in our emergency department on 10/15/2024.  He may return to work on 10/16/2024.       If you have any questions or concerns, please don't hesitate to call.      Gen Cortez PA-C
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
## 2024-10-16 ENCOUNTER — TELEPHONE (OUTPATIENT)
Dept: EMERGENCY MEDICINE | Facility: HOSPITAL | Age: 54
End: 2024-10-16
Payer: MEDICAID

## 2024-10-16 ENCOUNTER — PHARMACY VISIT (OUTPATIENT)
Dept: PHARMACY | Facility: CLINIC | Age: 54
End: 2024-10-16
Payer: MEDICARE

## 2024-10-16 PROCEDURE — RXMED WILLOW AMBULATORY MEDICATION CHARGE

## 2024-11-08 ENCOUNTER — APPOINTMENT (OUTPATIENT)
Dept: PRIMARY CARE | Facility: CLINIC | Age: 54
End: 2024-11-08
Payer: MEDICAID

## 2025-02-06 ENCOUNTER — APPOINTMENT (OUTPATIENT)
Dept: RADIOLOGY | Facility: HOSPITAL | Age: 55
End: 2025-02-06
Payer: COMMERCIAL

## 2025-02-06 ENCOUNTER — HOSPITAL ENCOUNTER (EMERGENCY)
Facility: HOSPITAL | Age: 55
Discharge: HOME | End: 2025-02-06
Payer: COMMERCIAL

## 2025-02-06 VITALS
HEIGHT: 68 IN | RESPIRATION RATE: 18 BRPM | TEMPERATURE: 98.6 F | BODY MASS INDEX: 21.13 KG/M2 | OXYGEN SATURATION: 98 % | HEART RATE: 70 BPM | DIASTOLIC BLOOD PRESSURE: 87 MMHG | SYSTOLIC BLOOD PRESSURE: 135 MMHG | WEIGHT: 139.44 LBS

## 2025-02-06 DIAGNOSIS — M25.551 PAIN OF RIGHT HIP: Primary | ICD-10-CM
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
## 2025-02-06 PROCEDURE — 70450 CT HEAD/BRAIN W/O DYE: CPT | Performed by: RADIOLOGY

## 2025-02-06 PROCEDURE — 73502 X-RAY EXAM HIP UNI 2-3 VIEWS: CPT | Mod: RIGHT SIDE | Performed by: RADIOLOGY

## 2025-02-06 PROCEDURE — 2500000005 HC RX 250 GENERAL PHARMACY W/O HCPCS

## 2025-02-06 PROCEDURE — 73502 X-RAY EXAM HIP UNI 2-3 VIEWS: CPT | Mod: RT

## 2025-02-06 PROCEDURE — 72125 CT NECK SPINE W/O DYE: CPT | Performed by: RADIOLOGY

## 2025-02-06 PROCEDURE — 70450 CT HEAD/BRAIN W/O DYE: CPT

## 2025-02-06 PROCEDURE — 72125 CT NECK SPINE W/O DYE: CPT

## 2025-02-06 PROCEDURE — 2500000001 HC RX 250 WO HCPCS SELF ADMINISTERED DRUGS (ALT 637 FOR MEDICARE OP)

## 2025-02-06 PROCEDURE — 99284 EMERGENCY DEPT VISIT MOD MDM: CPT | Mod: 25

## 2025-02-06 RX ORDER — ACETAMINOPHEN 325 MG/1
975 TABLET ORAL ONCE
Status: COMPLETED | OUTPATIENT
Start: 2025-02-06 | End: 2025-02-06

## 2025-02-06 RX ORDER — KETOROLAC TROMETHAMINE 30 MG/ML
15 INJECTION, SOLUTION INTRAMUSCULAR; INTRAVENOUS ONCE
Status: DISCONTINUED | OUTPATIENT
Start: 2025-02-06 | End: 2025-02-06 | Stop reason: HOSPADM

## 2025-02-06 RX ORDER — LIDOCAINE 560 MG/1
1 PATCH PERCUTANEOUS; TOPICAL; TRANSDERMAL DAILY
Status: DISCONTINUED | OUTPATIENT
Start: 2025-02-06 | End: 2025-02-06 | Stop reason: HOSPADM

## 2025-02-06 RX ORDER — ACETAMINOPHEN 500 MG
1000 TABLET ORAL EVERY 8 HOURS PRN
Qty: 18 TABLET | Refills: 0 | Status: SHIPPED | OUTPATIENT
Start: 2025-02-06 | End: 2025-02-09

## 2025-02-06 RX ADMIN — ACETAMINOPHEN 975 MG: 325 TABLET, FILM COATED ORAL at 13:52

## 2025-02-06 ASSESSMENT — PAIN SCALES - GENERAL
PAINLEVEL_OUTOF10: 1
PAINLEVEL_OUTOF10: 5 - MODERATE PAIN
PAINLEVEL_OUTOF10: 7

## 2025-02-06 ASSESSMENT — PAIN - FUNCTIONAL ASSESSMENT
PAIN_FUNCTIONAL_ASSESSMENT: 0-10
PAIN_FUNCTIONAL_ASSESSMENT: 0-10

## 2025-02-06 ASSESSMENT — PAIN DESCRIPTION - LOCATION: LOCATION: BACK

## 2025-02-06 ASSESSMENT — PAIN DESCRIPTION - PAIN TYPE: TYPE: ACUTE PAIN
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
## 2025-02-06 NOTE — Clinical Note
Wisam Davis was seen and treated in our emergency department on 2/6/2025.  He may return to work on 02/09/2025.       If you have any questions or concerns, please don't hesitate to call.      Raúl Jiang PA-C
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
## 2025-03-07 ENCOUNTER — APPOINTMENT (OUTPATIENT)
Dept: PRIMARY CARE | Facility: CLINIC | Age: 55
End: 2025-03-07
Payer: MEDICAID